# Patient Record
Sex: MALE | Race: AMERICAN INDIAN OR ALASKA NATIVE | Employment: FULL TIME | URBAN - METROPOLITAN AREA
[De-identification: names, ages, dates, MRNs, and addresses within clinical notes are randomized per-mention and may not be internally consistent; named-entity substitution may affect disease eponyms.]

---

## 2023-03-14 ENCOUNTER — HOSPITAL ENCOUNTER (OUTPATIENT)
Facility: HOSPITAL | Age: 28
Setting detail: OUTPATIENT SURGERY
Discharge: HOME | End: 2023-03-14
Attending: INTERNAL MEDICINE | Admitting: INTERNAL MEDICINE
Payer: COMMERCIAL

## 2023-03-14 VITALS
TEMPERATURE: 98.9 F | HEIGHT: 70 IN | BODY MASS INDEX: 28.8 KG/M2 | WEIGHT: 201.2 LBS | SYSTOLIC BLOOD PRESSURE: 122 MMHG | OXYGEN SATURATION: 98 % | HEART RATE: 67 BPM | RESPIRATION RATE: 16 BRPM | DIASTOLIC BLOOD PRESSURE: 68 MMHG

## 2023-03-14 PROCEDURE — 99152 MOD SED SAME PHYS/QHP 5/>YRS: CPT | Performed by: INTERNAL MEDICINE

## 2023-03-14 PROCEDURE — 7100000011 HC PHASE II RECOVERY - ADDTL 15 MIN: Performed by: INTERNAL MEDICINE

## 2023-03-14 PROCEDURE — 2709999900 HC NON-CHARGEABLE SUPPLY: Performed by: INTERNAL MEDICINE

## 2023-03-14 PROCEDURE — 6360000002 HC RX W HCPCS: Performed by: INTERNAL MEDICINE

## 2023-03-14 PROCEDURE — 3600007512: Performed by: INTERNAL MEDICINE

## 2023-03-14 PROCEDURE — 7100000010 HC PHASE II RECOVERY - FIRST 15 MIN: Performed by: INTERNAL MEDICINE

## 2023-03-14 PROCEDURE — 2580000003 HC RX 258: Performed by: INTERNAL MEDICINE

## 2023-03-14 PROCEDURE — 3600007502: Performed by: INTERNAL MEDICINE

## 2023-03-14 RX ORDER — ACETAMINOPHEN 500 MG
1000 TABLET ORAL AS NEEDED
COMMUNITY

## 2023-03-14 RX ORDER — CETIRIZINE HYDROCHLORIDE 10 MG/1
10 TABLET ORAL DAILY
COMMUNITY

## 2023-03-14 RX ORDER — PSEUDOEPHEDRINE HCL 30 MG
1 TABLET ORAL AS NEEDED
COMMUNITY
Start: 2022-03-17 | End: 2023-03-17

## 2023-03-14 RX ORDER — IBUPROFEN 200 MG
400 TABLET ORAL AS NEEDED
COMMUNITY

## 2023-03-14 RX ORDER — SODIUM CHLORIDE 9 MG/ML
INJECTION, SOLUTION INTRAVENOUS CONTINUOUS
Status: DISCONTINUED | OUTPATIENT
Start: 2023-03-14 | End: 2023-03-14 | Stop reason: HOSPADM

## 2023-03-14 RX ORDER — OLOPATADINE HYDROCHLORIDE 1 MG/ML
1 SOLUTION/ DROPS OPHTHALMIC AS NEEDED
COMMUNITY
Start: 2022-03-17 | End: 2023-03-17

## 2023-03-14 RX ORDER — ESOMEPRAZOLE MAGNESIUM 40 MG/1
40 FOR SUSPENSION ORAL DAILY
COMMUNITY

## 2023-03-14 RX ORDER — MIDAZOLAM HYDROCHLORIDE 1 MG/ML
INJECTION INTRAMUSCULAR; INTRAVENOUS PRN
Status: DISCONTINUED | OUTPATIENT
Start: 2023-03-14 | End: 2023-03-14 | Stop reason: ALTCHOICE

## 2023-03-14 RX ORDER — FENTANYL CITRATE 50 UG/ML
INJECTION, SOLUTION INTRAMUSCULAR; INTRAVENOUS PRN
Status: DISCONTINUED | OUTPATIENT
Start: 2023-03-14 | End: 2023-03-14 | Stop reason: ALTCHOICE

## 2023-03-14 RX ADMIN — SODIUM CHLORIDE: 9 INJECTION, SOLUTION INTRAVENOUS at 14:18

## 2023-03-14 ASSESSMENT — PAIN SCALES - GENERAL
PAINLEVEL_OUTOF10: 0

## 2023-03-14 ASSESSMENT — PAIN - FUNCTIONAL ASSESSMENT: PAIN_FUNCTIONAL_ASSESSMENT: 0-10

## 2023-03-14 NOTE — PROCEDURES
(EGD) Esophagogastroduodenoscopy (UPPER ENDOSCOPY) Procedure Note  [facility] [place]  __________________________________________________________________________________________________________________________        3/14/2023   Date of Procedure: 3/14/2023    Patient: Shelia Schroeder YOB: 1995 Gender: male Age: 33 y.o. INDICATION:  Pt of Nohemi Otero PA-C, referred to GI to establish care and for EGD for severe daily GERD x5 yr. He was previously seen by [facility] GI @[facility], and planned to have EGD, but unable to do so prior to separation from [employer]. Hx of GERD x5 year He was taking Large doses of NSAID's which he stopped in August and was switched from Omeprazole to Nexium 40 mg daily. About a month ago he had 2 episodes orf severe epigastric pain with nausea that lasted 3 days he increased the  Nexium 40mg now BID with improvement of symptoms but with occasional breakthrough symptoms well-managed with OTC Pepcid AC. Increased frequency to BID, due to PM rebound symptoms, and has been doing better since. He takes presently rarely NSAID's for pain. He quit smoking about 2 months ago denied ever abusing alcohol. Had foot surgery in 2009.   -No prior GI scope procedures. BMI: 29.27, BM: 1/day no melena    : Sydni Lee MD    Referring Provider:  SOFIYA Martell    Sedation:  Versed 5 mg IV, Fentanyl 100 mcg IV  Procedure Details:  After infomed consent was obtained for the procedure, with all risks and benefits of procedure explained to  the patient was taken to the endoscopy suite and placed in the left lateral decubitus position. Following sequential administration of sedation as per above, the endoscope was inserted into the mouth and advanced under direct vision to third portion of the duodenum. A careful inspection was made as the gastroscope was withdrawn, including a retroflexed view of the proximal stomach; findings and interventions are described below. OROPHARYNX: The vocal Cords and the larynx are normal.   ESOPHAGUS: The proximal, mid, and distal oesophagus are normal. The Z-Line is intact. No esophagitis, scarring or Cristobal's. No Hiatal hernia. Diaphragmatic opening or notch is located at 45 cm. STOMACH: No evidence of blood, fluid or solid food retention. There is a tiny erosion at the tip of a fold in the prepyloric channel on the lesser curvature side making the pylorus, spastic. The fundus on antegrade and retroflex views is normal. The cardia, body, lesser curvature, greater curvature and , the antrum are normal. The gastric mucosa is normal. No visible gastritis. DUODENUM: The bulb, second, third, portions and major papilla are normal.  PROXIMAL JEJUNUM:  Not examined. .  Therapies:  Nil    Specimen: none           Complications:   None    EBL:  Nil. IMPLANTS: * No implants in log *  IMPRESSION: A tiny erosion at the tip of a fold in the prepyloric channel on the lesser curvature side making the pylorus, spastic most likely NSAID's induced, otherwise normal esophagus, stomach and duodenum. RECOMMENDATION:  May resume antireflux diet. Avoid eating for three hours before reclining. May want to lift the head of the bed with a foam wedge to insert under the mattress. Avoid all NSAID's. Make a FU appointment at the office. Start to wean your self gradually off Nexium and after take only as needed. Need an US of the abdomen to exclude the possibility of gallstones? ?.      Assistant: None    --Tyra De Jesus MD on 3/14/2023 at 3:43 PM

## 2023-03-14 NOTE — PRE SEDATION
Sedation Pre-Procedure Note    Patient Name: Tad Kaminski   YOB: 1995  Room/Bed: ENDO/PL  Medical Record Number: [id number]  Date: 3/14/2023   Time: 3:42 PM       Indication:  GERD    Consent: I have discussed with the patient and/or the patient representative the indication, alternatives, and the possible risks and/or complications of the planned procedure and the anesthesia methods. The patient and/or patient representative appear to understand and agree to proceed. Vital Signs:   Vitals:    03/14/23 1356   BP: 108/61   Pulse: 78   Resp: 16   Temp: 99 °F (37.2 °C)   SpO2: 99%       Past Medical History:   has no past medical history on file. Past Surgical History:   has a past surgical history that includes fracture surgery (Right, 2009). Medications:   Scheduled Meds:   Continuous Infusions:    sodium chloride 100 mL/hr at 03/14/23 1418     PRN Meds:   Home Meds:   Prior to Admission medications    Medication Sig Start Date End Date Taking?  Authorizing Provider   esomeprazole Magnesium (NEXIUM) 40 MG PACK Take 40 mg by mouth daily Indications: Acid Indigestion   Yes Historical Provider, MD   acetaminophen (TYLENOL) 500 MG tablet Take 1,000 mg by mouth as needed for Pain   Yes Historical Provider, MD   ibuprofen (ADVIL;MOTRIN) 200 MG tablet Take 400 mg by mouth as needed for Pain   Yes Historical Provider, MD   olopatadine (PATANOL) 0.1 % ophthalmic solution Apply 1 drop to eye as needed 3/17/22 3/17/23 Yes Historical Provider, MD   pseudoephedrine (SUDAFED) 30 MG tablet Take 1 tablet by mouth as needed 3/17/22 3/17/23 Yes Historical Provider, MD   Multiple Vitamin (MULTIVITAMIN ADULT PO) Take 1 tablet by mouth daily   Yes Historical Provider, MD   cetirizine (ZYRTEC) 10 MG tablet Take 10 mg by mouth daily   Yes Historical Provider, MD     Coumadin Use Last 7 Days:  no  Antiplatelet drug therapy use last 7 days: no  Other anticoagulant use last 7 days: no  Additional Medication Information:  None      Pre-Sedation Documentation and Exam:   Vital signs have been reviewed (see flow sheet for vitals). I have reviewed the patient's history and review of systems.     Mallampati Airway Assessment:  normal, dentition not prohibitive, normal neck range of motion, Mallampati Class II - (soft palate, fauces & uvula are visible)    Prior History of Anesthesia Complications:   none    ASA Classification:  Class 1 - A normal healthy patient    Sedation/ Anesthesia Plan:   intravenous sedation    Medications Planned:   midazolam (Versed) intravenously and fentanyl intravenously    Patient is an appropriate candidate for plan of sedation: yes    Electronically signed by Eric Sanders MD on 3/14/2023 at 3:42 PM

## 2023-03-14 NOTE — DISCHARGE INSTRUCTIONS
DISCHARGE SUMMARY from Nurse    PATIENT INSTRUCTIONS:    After general anesthesia or intravenous sedation, for 24 hours or while taking prescription Narcotics:  Limit your activities  Do not drive and operate hazardous machinery  Do not make important personal or business decisions  Do  not drink alcoholic beverages  If you have not urinated within 8 hours after discharge, please contact your surgeon on call. Report the following to your surgeon:  Excessive pain, swelling, redness or odor of or around the surgical area  Temperature over 100.5  Nausea and vomiting lasting longer than 4 hours or if unable to take medications  Any signs of decreased circulation or nerve impairment to extremity: change in color, persistent  numbness, tingling, coldness or increase pain  Any questions    What to do at Home:  Recommended activity: as instructed in post op EGD instructions,     If you experience any of the following symptoms as instructed in post op EGD instructions, please follow up with Dr. Priscila Lin. *  Please give a list of your current medications to your Primary Care Provider. *  Please update this list whenever your medications are discontinued, doses are      changed, or new medications (including over-the-counter products) are added. *  Please carry medication information at all times in case of emergency situations. These are general instructions for a healthy lifestyle:    No smoking/ No tobacco products/ Avoid exposure to second hand smoke  Surgeon General's Warning:  Quitting smoking now greatly reduces serious risk to your health.     Obesity, smoking, and sedentary lifestyle greatly increases your risk for illness    A healthy diet, regular physical exercise & weight monitoring are important for maintaining a healthy lifestyle    You may be retaining fluid if you have a history of heart failure or if you experience any of the following symptoms:  Weight gain of 3 pounds or more overnight or 5 pounds in a week, increased swelling in our hands or feet or shortness of breath while lying flat in bed. Please call your doctor as soon as you notice any of these symptoms; do not wait until your next office visit. The discharge information has been reviewed with the patient and spouse. The patient and spouse verbalized understanding. Discharge medications reviewed with the patient and spouse and appropriate educational materials and side effects teaching were provided.     Patient {ARMBANDS:[id number]}    ___________________________________________________________________________________________________________________________________

## 2023-03-14 NOTE — H&P
Assessment/Plan  # Detail Type Description    1. Assessment GERD without esophagitis (K21.9). Impression Pt of Nohemi Otero PA-C, referred to GI to establish care and for EGD for GERD x1yr. He was previously seen by [facility] @[facility], and planned to have EGD, but unable to do so prior to separation from [employer].   _________________________________________________________  Hx of GERD x1 year - On PPI; Taking Nexium 40mg now BID (Needs Refill), with occasional breakthrough symptoms well-managed with OTC Pepcid AC. Increased frequency to BID, due to PM rebound symptoms, and has been doing better since.    -Discussed importance of proper PPI dosage timing  _________________________________________________________  -No prior GI scope procedures. BMI: 29.27, BM: 1/day  No reported hx of abdominal surgeries, strokes, or CAD. Patient Plan -Refill sent: Nexium 40mg BID  -May increase to 40mg OTC Pepcid PRN    *EGD Plan: Will proceed with EGD with Dr. Carlos Easton, to evaluate upper GI tract for abnormalities, evidence to explain symptoms (of bleeding), and Jacobs's epithelium with biopsies, polypectomy, or dilation as indicated. *EGD Risks:  Explained risks of procedure to include bleeding, infection, reaction to sedation, and perforation with possible need for admission to the hospital, and in the most extensive of  circumstances, the patient may require surgery. Pt verbalized understanding of these risks and is agreeable with this procedure.  ___________________________________   *GERD - Patient is advised to:  1. Eat smaller meals and maintain and lowfat, low carb diet  2. Change to decaffeinated beverages only  3. Not eat within 3-4 hours of bedtime  4. To elevate the torso while sleeping at night either by sleeping on a foam incline wedge or by elevating the head of the bed.   5. Avoid alcohol, NSAIDs, citrus and acidic foods as these things can contribute to acid reflux.  ___________________________________  *PPI BID Dosing Instructions:  -Instructed patient to take PPI first thing in the morning, and again 30 minutes before dinner, or wait 4 hours after dinner to take before bedtime.   -Always take on an empty stomach with a full glass of water, 30 minutes before eating any food to get the full effect of the medication as intended. -Use Tums, Pepcid, Gaviscon, or Maalox for breakthrough reflux symptoms. DO NOT STOP PPI ABDUPTLY!!! You WILL have rebound reflux. Plan Orders Further diagnostic evaluations ordered today include(s) UPPER GI ENDOSCOPY, DIAGNOSIS to be performed. 2. Assessment Epigastric pain (R10.13). Impression Intermittent midline epigastric pain, with associated symptoms of bloating, burning, pressure, and nausea. Usually, postprandial and triggered by fatty and spicy meals (i.e. dinner of buttered chicken, followed by Emely food for lunch prompting pt to leave work early 1-2 weeks ago with pain persisting for 3-4 days). During these episodes, he has to stop eating, or the symptoms will flare right up again. Discussed with pt, the likelihood of diet-induced gallbladder symptoms at these times (especially with positive FHx: MGM- s/p cholecystectomy). Patient Plan -Pt advised to maintain low-fat \"Anti-Reflux\" diet, or at least consume fat in moderation taking care to space out fatty meals and to medicate with BID Nexium 40mg and BID Pepcid 40mg to prevent/treat attack symptoms. -If symptoms become persistent/bothersome, then next gallbladder w/u would be indicated.  ___________________________________   *Gallbladder Attack Prevention Patient Instructions:   1) Maintain low fat diet <50 grams per day (when I was really hurting, I ate <30 grams per day). Read the nutrition labels. 2) Take Prilosec OTC (20 mg) 20-30 minutes before breakfast AND 20-30 minutes before Dinner.   3) Take Gas-X chewables, Gaviscon OTC, Pepto chewables, and Pepcid AC for break through gas and reflux. 4) Heating pad will be your best friend! This 33year old  patient was referred by Rylie Hernandez. This 33year old male presents for GERD. History of Present Illness  1. GERD   The onset of the heartburn was 1 year ago. The problem is getting worse. The patient reports epigastric pain and heartburn. It occurs daily. The symptoms are aggravated by spicy foods and dairy products. The symptoms are relieved by Nexium. Associated symptoms include nausea, reflux and bloating. Pertinent negatives include aspiration, chronic cough, dysphagia, globus sensation, hoarseness, post-nasal drainage, sore throat, vomiting and weight loss. Additional information: Pt never had any G. I. Procedure   1 x daily        Past Medical/Surgical History   (Detailed)  Disease/disorder Onset Date Management Date Comments   GERD             Family History   (Detailed)    Relationship Family Member Name  Age at Death Condition Onset Age Cause of Death       Family history of Hypertension  N   Maternal grandmother    Gallbladder disease  N     Social History  (Detailed)  Preferred language is English. Marital Status/Family/Social Support  Marital status:      Tobacco use status: Current non-smoker. Smoking status: Never smoker. Vaping Use  Screened for vaping? Yes    Alcohol  There is a history of alcohol use.        Medications (active prior to today)  Medication Instructions Start Date Stop Date Refilled Elsewhere   Zyrtec 10 mg tablet take 1 tablet by oral route  every day 2023   N   Nexium 40 mg capsule,delayed release take 1 capsule by oral route  every day 2023   N   meloxicam 15 mg tablet take 1 tablet by oral route  every day 2023  N   Medrol (Luigi) 4 mg tablets in a dose pack take by oral route as directed per package instructions 02/10/2023 2023  N       Medication Reconciliation  Medications reconciled today.    Medication Reviewed  Adherence Medication Name Sig Desc Elsewhere Status   taking as directed Zyrtec 10 mg tablet take 1 tablet by oral route  every day N Verified   taking as directed Nexium 40 mg capsule,delayed release take 1 capsule by oral route  every day N Verified     Medications (Added, Continued or Stopped today)  Start Date Medication Directions PRN Status PRN Reason Instruction Stop Date   02/10/2023 Medrol (Luigi) 4 mg tablets in a dose pack take by oral route as directed per package instructions N   02/22/2023 01/30/2023 meloxicam 15 mg tablet take 1 tablet by oral route  every day N   02/22/2023 01/30/2023 Nexium 40 mg capsule,delayed release take 1 capsule by oral route  every day N      01/30/2023 Zyrtec 10 mg tablet take 1 tablet by oral route  every day N        Allergies  Ingredient Reaction (Severity) Medication Name Comment   NO KNOWN ALLERGIES              Review of Systems  System Neg/Pos Details   Constitutional Negative Chills, Fever, Malaise and Weight loss. ENMT Negative Ear infections, Globus sensation, Hoarseness, Nasal congestion, Post-nasal drainage, Sinus Infection and Sore throat. Eyes Negative Double vision and Eye pain. Respiratory Negative Aspiration, Asthma, Chronic cough, Pleuritic pain and Wheezing. Cardio Negative Chest pain, Edema and Irregular heartbeat/palpitations. GI Positive Nausea, Reflux. GI Negative Abdominal pain, Change in bowel habits, Constipation, Decreased appetite, Diarrhea, Dysphagia, Heartburn, Hematemesis, Hematochezia and Vomiting.  Negative Dysuria, Hematuria, Urinary frequency, Urinary incontinence and Urinary retention. Endocrine Negative Cold intolerance, Gynecomastia, Heat intolerance and Increased thirst.   Neuro Negative Dizziness, Headache, Numbness, Tremors and Vertigo. Psych Negative Anxiety, Depression and Increased stress. Integumentary Negative Hives, Pruritus and Rash.    MS Negative Back pain, Joint pain and Myalgia. Hema/Lymph Negative Easy bleeding, Easy bruising and Lymphadenopathy. Allergic/Immuno Negative Chemicals in work place, Contact allergy, Food allergies, Immunosuppression and Seasonal allergies. Reproductive Negative Penile discharge and Sexual dysfunction. Vital Signs   Height  Time ft in cm Last Measured Height Position   11:25 AM 5.0 10.00 177.80 01/30/2023 0     Weight/BSA/BMI  Time lb oz kg Context BMI kg/m2 BSA m2   11:25 .00  92.533 dressed with shoes 29.27      Date/Time Temp Pulse Resp BP SpO2 O2 Device O2 Flow Rate (L/min) Weight [facility]   03/14/23 1356 99 °F (37.2 °C) 78 16 108/61 99 % None (Room air) -- 201 lb 3.2 oz (91.3 kg)      Physical  Exam  Exam Findings Details   Constitutional Normal Well developed. Eyes Normal Conjunctiva - Right: Normal, Left: Normal. Sclera - Right: Normal, Left: Normal.   Nasopharynx Normal Lips/teeth/gums - Normal.   Neck Exam Normal Inspection - Normal.   Respiratory Normal Inspection - Normal.   Cardiovascular Normal Regular rate and rhythm. No murmurs, gallops, or rubs. Vascular Normal Pulses - Brachial: Normal.   Skin Normal Inspection - Normal.   Musculoskeletal Normal Hands/Wrist - Right: Normal, Left: Normal.   Extremity Normal No edema. Neurological Normal Fine motor skills - Normal.   Psychiatric Normal Orientation - Oriented to time, place, person & situation. Appropriate mood and affect.      Active Patient Care Team Members  Name Contact Agency Type Support Role Relationship Active Date Inactive Date Specialty   Shellye Cross   Emergency Contact Spouse      Chip Dies   Patient provider PCP   Internal Medicine   [facility] [place]   encounter provider    Gastroenterology     Patient questioned and examined

## (undated) DEVICE — TOURNIQUET PHLEB W1XL18IN BLU FLAT RL AND BND REUSE FOR IV

## (undated) DEVICE — MOUTHPIECE ENDOSCP L CTRL OPN AND SIDE PORTS DISP

## (undated) DEVICE — KENDALL RADIOLUCENT FOAM MONITORING ELECTRODE RECTANGULAR SHAPE: Brand: KENDALL

## (undated) DEVICE — ENDO CARRY-ON PROCEDURE KIT INCLUDES ENZYMATIC SPONGE, GAUZE, BIOHAZARD LABEL, TRAY, LUBRICANT, DIRTY SCOPE LABEL, WATER LABEL, TRAY, DRAWSTRING PAD, AND DEFENDO 4-PIECE KIT.: Brand: ENDO CARRY-ON PROCEDURE KIT

## (undated) DEVICE — SYRINGE MED 3ML CLR PLAS STD N CTRL LUERLOCK TIP DISP

## (undated) DEVICE — Device

## (undated) DEVICE — SET ADMIN 16ML TBNG L100IN 2 Y INJ SITE IV PIGGY BK DISP (ORDER IN MULIPLES OF 48)

## (undated) DEVICE — CATHETER IV 22GA L1IN BLU POLYUR STR HUB RADPQ PROTCT +

## (undated) DEVICE — NEEDLE SYR 18GA L1.5IN RED PLAS HUB S STL BLNT FILL W/O

## (undated) DEVICE — EJECTOR SALIVA 6 IN FLX CLR

## (undated) DEVICE — SYRINGE MED 50ML LUERSLIP TIP